# Patient Record
Sex: FEMALE | Race: WHITE | NOT HISPANIC OR LATINO | Employment: OTHER
[De-identification: names, ages, dates, MRNs, and addresses within clinical notes are randomized per-mention and may not be internally consistent; named-entity substitution may affect disease eponyms.]

---

## 2017-10-09 ENCOUNTER — HOSPITAL (OUTPATIENT)
Dept: OTHER | Age: 37
End: 2017-10-09
Attending: FAMILY MEDICINE

## 2018-06-05 ENCOUNTER — HOSPITAL (OUTPATIENT)
Dept: OTHER | Age: 38
End: 2018-06-05
Attending: FAMILY MEDICINE

## 2019-09-11 ENCOUNTER — HOSPITAL (OUTPATIENT)
Dept: OTHER | Age: 39
End: 2019-09-11
Attending: FAMILY MEDICINE

## 2019-09-12 ENCOUNTER — HOSPITAL (OUTPATIENT)
Dept: OTHER | Age: 39
End: 2019-09-12
Attending: FAMILY MEDICINE

## 2019-12-10 DIAGNOSIS — R92.8 ABNORMAL MAMMOGRAM: Primary | ICD-10-CM

## 2019-12-26 ENCOUNTER — HOSPITAL ENCOUNTER (OUTPATIENT)
Dept: MAMMOGRAPHY | Age: 39
Discharge: HOME | End: 2019-12-26
Attending: FAMILY MEDICINE

## 2019-12-26 ENCOUNTER — HOSPITAL ENCOUNTER (OUTPATIENT)
Dept: ULTRASOUND IMAGING | Age: 39
Discharge: HOME | End: 2019-12-26
Attending: FAMILY MEDICINE

## 2019-12-26 DIAGNOSIS — R92.8 ABNORMAL MAMMOGRAM: ICD-10-CM

## 2019-12-26 PROCEDURE — 76642 ULTRASOUND BREAST LIMITED: CPT

## 2019-12-26 PROCEDURE — G0279 TOMOSYNTHESIS, MAMMO: HCPCS

## 2020-02-27 ENCOUNTER — WALK IN (OUTPATIENT)
Dept: URGENT CARE | Age: 40
End: 2020-02-27

## 2020-02-27 DIAGNOSIS — J02.0 STREP SORE THROAT: Primary | ICD-10-CM

## 2020-02-27 LAB
INTERNAL PROCEDURAL CONTROLS ACCEPTABLE: YES
S PYO AG THROAT QL IA.RAPID: POSITIVE

## 2020-02-27 PROCEDURE — 87880 STREP A ASSAY W/OPTIC: CPT | Performed by: NURSE PRACTITIONER

## 2020-02-27 PROCEDURE — 99203 OFFICE O/P NEW LOW 30 MIN: CPT | Performed by: NURSE PRACTITIONER

## 2020-02-27 RX ORDER — CEPHALEXIN 500 MG/1
500 CAPSULE ORAL 2 TIMES DAILY
Qty: 20 CAPSULE | Refills: 0 | Status: SHIPPED | OUTPATIENT
Start: 2020-02-27 | End: 2020-03-08

## 2020-02-27 ASSESSMENT — ENCOUNTER SYMPTOMS
FEVER: 1
VOMITING: 0
HEADACHES: 1
FATIGUE: 0
SORE THROAT: 1
RECTAL PAIN: 0
NAUSEA: 0
DIARRHEA: 0

## 2020-02-27 ASSESSMENT — PAIN SCALES - GENERAL: PAINLEVEL: 1-2

## 2021-05-26 VITALS
SYSTOLIC BLOOD PRESSURE: 106 MMHG | HEART RATE: 68 BPM | TEMPERATURE: 98.3 F | DIASTOLIC BLOOD PRESSURE: 70 MMHG | RESPIRATION RATE: 16 BRPM

## 2021-06-07 ENCOUNTER — HOSPITAL ENCOUNTER (OUTPATIENT)
Dept: MAMMOGRAPHY | Age: 41
Discharge: HOME | End: 2021-06-07
Attending: FAMILY MEDICINE

## 2021-06-07 DIAGNOSIS — R92.8 ABNORMAL MAMMOGRAM: ICD-10-CM

## 2021-06-07 PROCEDURE — 77066 DX MAMMO INCL CAD BI: CPT

## 2022-07-30 ENCOUNTER — OFFICE VISIT (OUTPATIENT)
Dept: URGENT CARE | Age: 42
End: 2022-07-30

## 2022-07-30 VITALS
HEART RATE: 86 BPM | DIASTOLIC BLOOD PRESSURE: 70 MMHG | RESPIRATION RATE: 20 BRPM | SYSTOLIC BLOOD PRESSURE: 110 MMHG | TEMPERATURE: 97.9 F

## 2022-07-30 DIAGNOSIS — N39.0 URINARY TRACT INFECTION WITHOUT HEMATURIA, SITE UNSPECIFIED: Primary | ICD-10-CM

## 2022-07-30 DIAGNOSIS — R39.9 GU (GENITOURINARY) SYMPTOMS: ICD-10-CM

## 2022-07-30 LAB
APPEARANCE, POC: CLEAR
BILIRUBIN, POC: NEGATIVE
COLOR, POC: YELLOW
GLUCOSE UR-MCNC: NEGATIVE MG/DL
KETONES, POC: NEGATIVE MG/DL
NITRITE, POC: NEGATIVE
OCCULT BLOOD, POC: ABNORMAL
PH UR: 7.5 [PH] (ref 5–7)
PROT UR-MCNC: NEGATIVE MG/DL
SP GR UR: 1.02 (ref 1–1.03)
UROBILINOGEN UR-MCNC: 0.2 MG/DL (ref 0–1)
WBC (LEUKOCYTE) ESTERASE, POC: ABNORMAL

## 2022-07-30 PROCEDURE — X0943 AMG SELF PAY VISIT: HCPCS | Performed by: NURSE PRACTITIONER

## 2022-07-30 RX ORDER — NITROFURANTOIN 25; 75 MG/1; MG/1
100 CAPSULE ORAL 2 TIMES DAILY
Qty: 10 CAPSULE | Refills: 0 | Status: SHIPPED | OUTPATIENT
Start: 2022-07-30 | End: 2022-08-04

## 2022-07-30 ASSESSMENT — ENCOUNTER SYMPTOMS
CONSTITUTIONAL NEGATIVE: 1
GASTROINTESTINAL NEGATIVE: 1
NAUSEA: 0
EYES NEGATIVE: 1
VOMITING: 0
RESPIRATORY NEGATIVE: 1
ENDOCRINE NEGATIVE: 1
HEMATOLOGIC/LYMPHATIC NEGATIVE: 1
NEUROLOGICAL NEGATIVE: 1
ALLERGIC/IMMUNOLOGIC NEGATIVE: 1
PSYCHIATRIC NEGATIVE: 1

## 2022-08-04 ENCOUNTER — TELEPHONE (OUTPATIENT)
Dept: SCHEDULING | Age: 42
End: 2022-08-04

## 2022-08-05 ENCOUNTER — OFFICE VISIT (OUTPATIENT)
Dept: URGENT CARE | Age: 42
End: 2022-08-05

## 2022-08-05 VITALS
HEIGHT: 60 IN | BODY MASS INDEX: 23.36 KG/M2 | TEMPERATURE: 98 F | WEIGHT: 119 LBS | OXYGEN SATURATION: 100 % | HEART RATE: 90 BPM | RESPIRATION RATE: 16 BRPM

## 2022-08-05 DIAGNOSIS — R39.15 URGENCY OF URINATION: Primary | ICD-10-CM

## 2022-08-05 LAB
APPEARANCE, POC: CLEAR
BILIRUBIN, POC: NEGATIVE
COLOR, POC: YELLOW
GLUCOSE UR-MCNC: NEGATIVE MG/DL
KETONES, POC: NEGATIVE MG/DL
NITRITE, POC: NEGATIVE
OCCULT BLOOD, POC: NEGATIVE
PH UR: 7.5 [PH] (ref 5–7)
PROT UR-MCNC: NEGATIVE MG/DL
SP GR UR: 1 (ref 1–1.03)
UROBILINOGEN UR-MCNC: 0.2 MG/DL (ref 0–1)
WBC (LEUKOCYTE) ESTERASE, POC: NEGATIVE

## 2022-08-05 PROCEDURE — X0943 AMG SELF PAY VISIT: HCPCS | Performed by: NURSE PRACTITIONER

## 2022-08-05 PROCEDURE — 87086 URINE CULTURE/COLONY COUNT: CPT | Performed by: INTERNAL MEDICINE

## 2022-08-05 ASSESSMENT — ENCOUNTER SYMPTOMS
NAUSEA: 0
CHILLS: 0
VOMITING: 0

## 2022-08-07 LAB — BACTERIA UR CULT: NORMAL

## 2023-07-28 ENCOUNTER — OFFICE VISIT (OUTPATIENT)
Dept: URGENT CARE | Age: 43
End: 2023-07-28

## 2023-07-28 VITALS
SYSTOLIC BLOOD PRESSURE: 100 MMHG | RESPIRATION RATE: 18 BRPM | TEMPERATURE: 98.2 F | DIASTOLIC BLOOD PRESSURE: 64 MMHG | HEART RATE: 80 BPM | OXYGEN SATURATION: 99 % | WEIGHT: 118 LBS | BODY MASS INDEX: 23.16 KG/M2 | HEIGHT: 60 IN

## 2023-07-28 DIAGNOSIS — J02.9 ACUTE VIRAL PHARYNGITIS: Primary | ICD-10-CM

## 2023-07-28 PROBLEM — Z87.891 FORMER SMOKER: Status: ACTIVE | Noted: 2023-02-22

## 2023-07-28 PROBLEM — J45.909 ASTHMA DUE TO SEASONAL ALLERGIES: Status: ACTIVE | Noted: 2023-02-22

## 2023-07-28 LAB
INTERNAL PROCEDURAL CONTROLS ACCEPTABLE: YES
S PYO AG THROAT QL IA.RAPID: NEGATIVE
TEST LOT EXPIRATION DATE: NORMAL
TEST LOT NUMBER: NORMAL

## 2023-07-28 PROCEDURE — 87081 CULTURE SCREEN ONLY: CPT | Performed by: INTERNAL MEDICINE

## 2023-07-28 PROCEDURE — 99213 OFFICE O/P EST LOW 20 MIN: CPT | Performed by: NURSE PRACTITIONER

## 2023-07-28 PROCEDURE — 87880 STREP A ASSAY W/OPTIC: CPT | Performed by: NURSE PRACTITIONER

## 2023-07-28 RX ORDER — MONTELUKAST SODIUM 10 MG/1
10 TABLET ORAL DAILY
COMMUNITY
Start: 2023-05-22

## 2023-07-28 ASSESSMENT — ENCOUNTER SYMPTOMS
WEAKNESS: 0
TROUBLE SWALLOWING: 0
ACTIVITY CHANGE: 0
APPETITE CHANGE: 0
PSYCHIATRIC NEGATIVE: 1
CHILLS: 0
SINUS PAIN: 0
VOICE CHANGE: 0
ABDOMINAL PAIN: 0
RHINORRHEA: 0
HOARSE VOICE: 1
SHORTNESS OF BREATH: 0
SINUS PRESSURE: 0
WHEEZING: 0
HEADACHES: 0
SPEECH DIFFICULTY: 0
CHOKING: 0
DIZZINESS: 0
FATIGUE: 0
FEVER: 1
SORE THROAT: 1
CHEST TIGHTNESS: 0
COUGH: 0
VOMITING: 0
LIGHT-HEADEDNESS: 0

## 2023-07-28 ASSESSMENT — PAIN SCALES - GENERAL: PAINLEVEL: 2

## 2023-07-31 LAB — S PYO SPEC QL CULT: NORMAL

## 2023-08-01 ENCOUNTER — TELEPHONE (OUTPATIENT)
Dept: URGENT CARE | Age: 43
End: 2023-08-01

## 2024-05-02 ENCOUNTER — WALK IN (OUTPATIENT)
Dept: URGENT CARE | Age: 44
End: 2024-05-02

## 2024-05-02 VITALS
OXYGEN SATURATION: 98 % | RESPIRATION RATE: 18 BRPM | HEART RATE: 78 BPM | SYSTOLIC BLOOD PRESSURE: 100 MMHG | TEMPERATURE: 98.6 F | DIASTOLIC BLOOD PRESSURE: 68 MMHG

## 2024-05-02 DIAGNOSIS — R39.15 URINARY URGENCY: Primary | ICD-10-CM

## 2024-05-02 DIAGNOSIS — R10.2 SUPRAPUBIC PAIN: ICD-10-CM

## 2024-05-02 LAB
APPEARANCE, POC: CLEAR
BILIRUBIN, POC: NEGATIVE
COLOR, POC: YELLOW
GLUCOSE UR-MCNC: NEGATIVE MG/DL
KETONES, POC: NEGATIVE MG/DL
NITRITE, POC: NEGATIVE
OCCULT BLOOD, POC: NEGATIVE
PH UR: 6 [PH] (ref 5–7)
PROT UR-MCNC: NEGATIVE MG/DL
SP GR UR: 1 (ref 1–1.03)
UROBILINOGEN UR-MCNC: 0.2 MG/DL (ref 0–1)
WBC (LEUKOCYTE) ESTERASE, POC: NEGATIVE

## 2024-05-02 PROCEDURE — 99213 OFFICE O/P EST LOW 20 MIN: CPT | Performed by: NURSE PRACTITIONER

## 2024-05-02 PROCEDURE — 81002 URINALYSIS NONAUTO W/O SCOPE: CPT | Performed by: NURSE PRACTITIONER

## 2024-05-02 ASSESSMENT — ENCOUNTER SYMPTOMS: FEVER: 1

## 2024-05-03 LAB — BACTERIA UR CULT: NO GROWTH

## 2024-05-04 ENCOUNTER — TELEPHONE (OUTPATIENT)
Dept: FAMILY MEDICINE | Age: 44
End: 2024-05-04

## 2024-08-28 ENCOUNTER — V-VISIT (OUTPATIENT)
Dept: URGENT CARE | Age: 44
End: 2024-08-28

## 2024-08-28 VITALS
TEMPERATURE: 100.1 F | HEART RATE: 115 BPM | DIASTOLIC BLOOD PRESSURE: 70 MMHG | BODY MASS INDEX: 23.56 KG/M2 | RESPIRATION RATE: 19 BRPM | SYSTOLIC BLOOD PRESSURE: 118 MMHG | WEIGHT: 120 LBS | OXYGEN SATURATION: 96 % | HEIGHT: 60 IN

## 2024-08-28 DIAGNOSIS — J06.9 BACTERIAL UPPER RESPIRATORY INFECTION: Primary | ICD-10-CM

## 2024-08-28 DIAGNOSIS — B96.89 BACTERIAL UPPER RESPIRATORY INFECTION: Primary | ICD-10-CM

## 2024-08-28 DIAGNOSIS — R50.9 FEVER, UNSPECIFIED FEVER CAUSE: ICD-10-CM

## 2024-08-28 LAB
FLUAV AG UPPER RESP QL IA.RAPID: NEGATIVE
FLUBV AG UPPER RESP QL IA.RAPID: NEGATIVE
SARS-COV+SARS-COV-2 AG RESP QL IA.RAPID: NOT DETECTED
TEST LOT EXPIRATION DATE: NORMAL
TEST LOT NUMBER: NORMAL

## 2024-08-28 RX ORDER — GUAIFENESIN 600 MG/1
1200 TABLET, EXTENDED RELEASE ORAL 2 TIMES DAILY
COMMUNITY
Start: 2024-08-28

## 2024-08-28 RX ORDER — AZITHROMYCIN 250 MG/1
TABLET, FILM COATED ORAL
Qty: 6 TABLET | Refills: 0 | Status: SHIPPED | OUTPATIENT
Start: 2024-08-28 | End: 2024-09-02

## 2024-08-28 ASSESSMENT — ENCOUNTER SYMPTOMS
GASTROINTESTINAL NEGATIVE: 1
EYES NEGATIVE: 1
ALLERGIC/IMMUNOLOGIC NEGATIVE: 1
NEUROLOGICAL NEGATIVE: 1
SHORTNESS OF BREATH: 1
CHILLS: 1
PSYCHIATRIC NEGATIVE: 1
COUGH: 1
HEMATOLOGIC/LYMPHATIC NEGATIVE: 1
ENDOCRINE NEGATIVE: 1
FEVER: 1

## 2024-09-05 ENCOUNTER — TELEPHONE (OUTPATIENT)
Dept: URGENT CARE | Age: 44
End: 2024-09-05

## 2024-09-05 ENCOUNTER — APPOINTMENT (OUTPATIENT)
Dept: URGENT CARE | Age: 44
End: 2024-09-05

## 2024-09-10 ENCOUNTER — TELEPHONE (OUTPATIENT)
Dept: ENDOCRINOLOGY CLINIC | Facility: CLINIC | Age: 44
End: 2024-09-10

## 2024-09-10 ENCOUNTER — LAB ENCOUNTER (OUTPATIENT)
Dept: LAB | Facility: HOSPITAL | Age: 44
End: 2024-09-10
Attending: INTERNAL MEDICINE

## 2024-09-10 ENCOUNTER — OFFICE VISIT (OUTPATIENT)
Dept: ENDOCRINOLOGY CLINIC | Facility: CLINIC | Age: 44
End: 2024-09-10

## 2024-09-10 VITALS
SYSTOLIC BLOOD PRESSURE: 114 MMHG | BODY MASS INDEX: 24.5 KG/M2 | WEIGHT: 124.81 LBS | HEART RATE: 96 BPM | HEIGHT: 60 IN | DIASTOLIC BLOOD PRESSURE: 76 MMHG

## 2024-09-10 DIAGNOSIS — E04.1 THYROID NODULE: ICD-10-CM

## 2024-09-10 DIAGNOSIS — R23.2 HOT FLASHES: ICD-10-CM

## 2024-09-10 DIAGNOSIS — E04.1 THYROID NODULE: Primary | ICD-10-CM

## 2024-09-10 LAB
ESTRADIOL SERPL-MCNC: 118.3 PG/ML
FSH SERPL-ACNC: 8.6 MIU/ML
TSI SER-ACNC: 1.09 MIU/ML (ref 0.55–4.78)

## 2024-09-10 PROCEDURE — 82670 ASSAY OF TOTAL ESTRADIOL: CPT

## 2024-09-10 PROCEDURE — 84443 ASSAY THYROID STIM HORMONE: CPT

## 2024-09-10 PROCEDURE — 36415 COLL VENOUS BLD VENIPUNCTURE: CPT

## 2024-09-10 PROCEDURE — 83001 ASSAY OF GONADOTROPIN (FSH): CPT

## 2024-09-10 PROCEDURE — 99203 OFFICE O/P NEW LOW 30 MIN: CPT | Performed by: INTERNAL MEDICINE

## 2024-09-10 NOTE — H&P
New Patient Evaluation - History and Physical    CONSULT - Reason for Visit:  Thyroid nodule  Requesting Physician:     CHIEF COMPLAINT:    Chief Complaint   Patient presents with    Consult     Pt is here for consult for thyroid nodules, pcp referred pt to see endocrinologist. Pt needs an evaluation.         HISTORY OF PRESENT ILLNESS:   Marga Romero is a 44 year old female who presents for evaluation of thyroid nodule    Discovered incidentally on imaging, around age 36  Noted on US done at PCP office, has not had a US with radiology   FNA was in 2016 , reports that it was benign   States that nodule has grown in size over the last few years    Personal or Family history of thyroid cancer:denies  Personal or family history of MEN : denies  Exposure to head and neck radiation before age 22: She was around [place] when she was a baby  Compressive symptoms (difficulty in breathing or swallowing): denies        PAST MEDICAL HISTORY:   No past medical history on file.    PAST SURGICAL HISTORY:   No past surgical history on file.    CURRENT MEDICATIONS:    No current outpatient medications on file.       ALLERGIES:  Allergies   Allergen Reactions    Seasonal OTHER (SEE COMMENTS)     Chest congestion        SOCIAL HISTORY:    Social History     Socioeconomic History    Marital status:    Tobacco Use    Smoking status: Never    Smokeless tobacco: Never       FAMILY HISTORY:   No family history on file.    ASSESSMENTS:     REVIEW OF SYSTEMS:  Constitutional: Negative for: weight change, fever, fatigue, + heat intolerance  Eyes: Negative for:  Visual changes, proptosis, blurring  ENT: Negative for:  dysphagia, neck swelling, dysphonia  Respiratory: Negative for:  dyspnea, cough  Cardiovascular: Negative for:  chest pain, palpitations, orthopnea  GI: Negative for:  abdominal pain, nausea, vomiting, diarrhea, constipation, bleeding  Neurology: Negative for: headache, numbness, weakness  Genito-Urinary: Negative for:  dysuria, frequency  Psychiatric: Negative for:  depression, anxiety  Hematology/Lymphatics: Negative for: bruising, lower extremity edema  Endocrine: Negative for: polyuria, polydypsia  Skin: Negative for: rash, blister, cellulitis,      PHYSICAL EXAM:   Vitals:    09/10/24 1131   BP: 114/76   Pulse: 96   Weight: 124 lb 12.8 oz (56.6 kg)   Height: 5' (1.524 m)     BMI: Body mass index is 24.37 kg/m².         General Appearance:  alert, well developed, in no acute distress  Nutritional:  no extreme weight gain or loss  Head: Atraumatic  Eyes:  normal conjunctivae, sclera., normal sclera and normal pupils  Throat/Neck: normal sound to voice. Normal hearing, normal speech, thyroid nodule palpated  Respiratory:  Speaking in full sentences, non-labored. no increased work of breathing, no audible wheezing    Neuro: motor grossly intact, moving all extremities without difficulty  Psychiatric:  oriented to time, self, and place  Extremities: no obvious extremity swelling        DATA:     Pertinent data reviewed    NM thyroid scan in 2019    FINDINGS: 6 and 24-hour thyroid uptake and scan was performed after the oral administration of    228.0 uCi of I-123.  The 6 hour uptake is 17.6% and the 24-hour uptake is 24.1% with normal    ranges being between 5 and 18% and 10 and 30% respectively.     On delayed static pinhole imaging there is a warm nodule seen in the mid lateral aspect of the    left thyroid lobe which corresponds to the thyroid nodule.  Otherwise homogeneous uptake is seen    throughout the thyroid gland.     ASSESSMENT AND PLAN:      Patient is a 44 year old female with thyroid nodule  Since 205/2016  FNA benign in the past   No compressive symptoms  Nodule has grown in size over years  Noted NM thyroid scan results: reported a warm nodule  Denies any h/o hyperthyroidism  She reports changes in cycles and night sweats    PLAN:     Labs today   Will get [facility] for records from PCP   Will get a thyroid US and TSh  with reflex  Discussed significant of warm and cold nodules    Further management will be based on results  Patient to call me if she does not hear from the office by 9/17/2024    Patient verbalized a complete  understanding of all of the above and did not have any further questions.     Orders Placed This Encounter   Procedures    TSH W Reflex To Free T4    FSH    Estradiol         9/10/2024  Jeanne Hein MD        Patient verbalized a complete  understanding of all of the above and did not have any further questions.

## 2024-09-10 NOTE — TELEPHONE ENCOUNTER
TSH normal   Estrogen levels normal, hence she is not in menopause , but could be in yani menopause as discussed  I will let her know once I get results of US and records from PCP   Thanks

## 2024-09-23 ENCOUNTER — TELEPHONE (OUTPATIENT)
Dept: ENDOCRINOLOGY CLINIC | Facility: CLINIC | Age: 44
End: 2024-09-23

## 2024-09-23 DIAGNOSIS — E04.1 THYROID NODULE: Primary | ICD-10-CM

## 2024-10-02 NOTE — TELEPHONE ENCOUNTER
I do not have old records yet   Please see encounter from before , we had faxed AGGIE--> can please contact them again for old reports   Can also request patient to call them     Received results of yfn thyroid US from 9/19/2024  She has a big nodule in the left lobe of the thyroid measuring 4.3 x 2.6 x 2.6 cm   This is a big nodule    Typically nodules over 1 cm are biopsied  Patient had reported that she already had a biopsy in the past   After that we follow nodules for stability in size  Once I have the old report I can compare    Thanks

## 2024-10-03 NOTE — TELEPHONE ENCOUNTER
DEBORAH to call clinic to relay message below    Dr. Hein,  From [facility]:  EXAM: US GUIDE THYROID BIOPSY SI    CLINICAL INDICATION: Presents for biopsy of a 3.2 cm left thyroid lobe nodule noted on an outside ultrasound    COMPARISON: Outside 06/11/2016 thyroid ultrasound    FINDINGS: The procedure was described to the patient. Potential risks of bleeding, infection, allergic reaction to administered medication were discussed. The patient's questions were answered. The patient stated understanding of the procedure. The patient gave verbal and signed consent.    Timeout procedure was performed with the patient stating name, date of birth and procedure including the side. Maximal sterile barrier technique employed. Local anesthesia with 2% lidocaine and Neut.    Under ultrasound guidance fine-needle biopsies (FNA) were obtained of the 3.2 cm left thyroid nodule. Rotex needle employed. Preliminary screening of the cytologic slides by Dr. Rodriguez demonstrated an adequate sample. Following the procedure, successful hemostasis was achieved. The patient tolerated the procedure well and there were no immediate complications. Patient reported no pain following the procedure.    Post care and discharge instructions were given to the patient. Patient will follow up with Dr. Fisher.    IMPRESSION:  1. Left thyroid nodule biopsied under ultrasound guidance. No immediate complications.  ** F I N A L **    Transcribed By: ALESHIA  06/28/16 12:44 pm    Cytologic Interpretation :     Left Thyroid FNA:     Statement of Adequacy:          Satisfactory for evaluation.     Descriptive Interpretation:          Negative for malignancy.          Abundant colloid, benign follicular cells and lymphocytes (see note).     Note: The presence of lymphocytes raises the possibility of lymphocytic   (Hashimoto) thyroiditis. Suggest serum antibodies to rule in/out Hashimoto's   thyroiditis. Clinical correlation suggested. [Clements  Classification: II   (benign)]       Astrid Rodriguez M.D.     ** Electronic Signature (SOFY) 6/30/2016 03:21 **       EXAM: NM THYROID IMAGING W UPTAKE MULT    CLINICAL INDICATION: Thyroid nodule.    COMPARISON: Ultrasound guided thyroid biopsy 06/28/2016.    FINDINGS: 6 and 24-hour thyroid uptake and scan was performed after the oral administration of  228.0 uCi of I-123. The 6 hour uptake is 17.6% and the 24-hour uptake is 24.1% with normal  ranges being between 5 and 18% and 10 and 30% respectively.    On delayed static pinhole imaging there is a warm nodule seen in the mid lateral aspect of the  left thyroid lobe which corresponds to the thyroid nodule. Otherwise homogeneous uptake is seen  throughout the thyroid gland.    IMPRESSION:  1. Normal 6 and 24-hour thyroid uptake.  2. Warm nodule in the mid lateral aspect of the left thyroid lobe.    ----- F I N A L -----    Transcribed By: ALESHIA  09/13/19 1:38 pm      Please advise if this is what you are looking for to compare with current US -thanks

## 2024-10-04 NOTE — TELEPHONE ENCOUNTER
Noted additional information   What we are still missing is the old ultrasound report   We can please request that     Can please let the patient know:   We will try to get the US report again , can contact her previous physician again  If she has it with her she can send it on my chart      She had an ultrasound and 2019 that showed a left-sided nodule measuring 3.2 cm in size.  It seems like this nodule has been biopsied in the past and was reported as a benign nodule.  She also had a nuclear medicine scan where she was seen to have a left nodule that is warm.  Typically warm nodules are not cancerous.    Her most recent ultrasound shows that the nodule is slightly bigger in size. 4.3 x 2.6 x 2.6 cm   However it is also difficult to compare ultrasound done at 2 different places.  Hence to monitor the size more closely I recommend that she please repeat an ultrasound in 6 months to monitor for stability.  Can please order thyroid ultrasound diagnosis thyroid nodule

## 2024-10-08 ENCOUNTER — TELEPHONE (OUTPATIENT)
Dept: ENDOCRINOLOGY CLINIC | Facility: CLINIC | Age: 44
End: 2024-10-08

## 2024-10-08 NOTE — TELEPHONE ENCOUNTER
Received fax with labs, diagnostic imaging report, thyroid labs, sonogram from Dr. Carreno from [facility]. Placed in providers folder for review.

## 2024-10-08 NOTE — TELEPHONE ENCOUNTER
Patient was seen at [facility] in [place], phone number is [phone].     Spoke to Hannah, per rep she will fax thyroid ultrasound from 2016, 2019, 2023 and 2024. RN requested thyroid labs too (TSH, T3, T4)    Called patient to update and provided message from Dr Hein below.     Will wait for medical records.     Thyroid ultrasound ordered. Patient to complete on 4/2025

## 2024-10-15 ENCOUNTER — PATIENT MESSAGE (OUTPATIENT)
Dept: ENDOCRINOLOGY CLINIC | Facility: CLINIC | Age: 44
End: 2024-10-15

## 2024-10-15 DIAGNOSIS — E04.1 THYROID NODULE: Primary | ICD-10-CM

## 2024-10-15 NOTE — TELEPHONE ENCOUNTER
Endostaff:  Please call the patient if no response to [employer] message by 10/22/2024.  Thanks.